# Patient Record
Sex: MALE | Employment: OTHER | URBAN - NONMETROPOLITAN AREA
[De-identification: names, ages, dates, MRNs, and addresses within clinical notes are randomized per-mention and may not be internally consistent; named-entity substitution may affect disease eponyms.]

---

## 2023-06-01 ENCOUNTER — OFFICE VISIT (OUTPATIENT)
Dept: URGENT CARE | Facility: CLINIC | Age: 78
End: 2023-06-01

## 2023-06-01 ENCOUNTER — HOSPITAL ENCOUNTER (OUTPATIENT)
Dept: RADIOLOGY | Facility: CLINIC | Age: 78
End: 2023-06-01
Payer: COMMERCIAL

## 2023-06-01 VITALS
RESPIRATION RATE: 18 BRPM | DIASTOLIC BLOOD PRESSURE: 60 MMHG | TEMPERATURE: 97.2 F | BODY MASS INDEX: 23.62 KG/M2 | HEART RATE: 77 BPM | WEIGHT: 165 LBS | OXYGEN SATURATION: 97 % | HEIGHT: 70 IN | SYSTOLIC BLOOD PRESSURE: 120 MMHG

## 2023-06-01 DIAGNOSIS — S69.92XA HAND INJURY, LEFT, INITIAL ENCOUNTER: Primary | ICD-10-CM

## 2023-06-01 DIAGNOSIS — S69.92XA INJURY OF LEFT HAND, INITIAL ENCOUNTER: ICD-10-CM

## 2023-06-01 PROCEDURE — 73130 X-RAY EXAM OF HAND: CPT

## 2023-06-01 PROCEDURE — G0463 HOSPITAL OUTPT CLINIC VISIT: HCPCS

## 2023-06-01 RX ORDER — BALSALAZIDE DISODIUM 750 MG/1
750 CAPSULE ORAL DAILY
COMMUNITY
Start: 2023-03-24

## 2023-06-01 RX ORDER — ASPIRIN 81 MG/1
81 TABLET ORAL
COMMUNITY
Start: 2023-05-08 | End: 2024-05-07

## 2023-06-01 RX ORDER — LISINOPRIL 10 MG/1
1 TABLET ORAL EVERY MORNING
COMMUNITY

## 2023-06-01 RX ORDER — ROSUVASTATIN CALCIUM 20 MG/1
20 TABLET, COATED ORAL DAILY
COMMUNITY
Start: 2023-01-04

## 2023-06-01 RX ORDER — CLOPIDOGREL BISULFATE 75 MG/1
75 TABLET ORAL DAILY
COMMUNITY
Start: 2023-01-13 | End: 2024-05-07

## 2023-06-01 NOTE — PROGRESS NOTES
[facility]        NAME: Arianna Cisneros is a 78 y o  male  : 1945    MRN: [id number]  DATE: 2023  TIME: 12:40 PM    Assessment and Plan   Hand injury, left, initial encounter [S69 [id number]]  1  Hand injury, left, initial encounter  XR hand 3+ vw left        Preliminary x-ray read negative for acute osseous abnormality, final read pending  Superficial laceration, no sutures required for wound closure  Tdap UTD  Wound cleansed and bandage applied by nursing staff  Encouraged continued supportive measures  Standard wound care  Monitor for signs of infection  Follow up with PCP in 3-5 days or proceed to emergency department for worsening symptoms  Patient verbalized understanding of instructions given  Patient Instructions     Patient Instructions     Preliminary XR read negative, final read pending  Cleanse daily with warm water and soap  Monitor for signs of infection, including redness, swelling, drainage, or fever/chills  Keep covered in public and open to air at home   Follow up with PCP in 3-5 days  Proceed to  ER if symptoms worsen  Laceration   AMBULATORY CARE:   A laceration  is an injury to the skin and the soft tissue underneath it  Lacerations can happen anywhere on the body  Common symptoms include the following:   • Injury or wound to skin and tissue of any shape size that looks like a cut, tear, or gash    • Edges of the wound may be close together or wide apart    • Pain, bleeding, bruising, or swelling    • Numbness around the wound    • Decreased movement in an area below the wound    Seek care immediately if:   • You have heavy bleeding or bleeding that does not stop after 10 minutes of holding firm, direct pressure over the wound  • Your wound opens up  Call your doctor if:   • You have a fever or chills  • Your laceration is red, warm, or swollen  • You have red streaks on your skin coming from your wound      • You have white or yellow drainage from the wound that smells bad  • You have pain that gets worse, even after treatment  • You have questions or concerns about your condition or care  Treatment for a laceration  includes care to stop any bleeding  Your healthcare provider will stop the bleeding by applying pressure to the wound  He or she may need to check your wound for foreign objects and clean it to decrease the chance of infection  Your laceration may be closed with stitches, staples, tissue glue, or medical strips  Ask your healthcare provider if you need a tetanus shot  Medicines: You may need any of the following:  • Prescription pain medicine  may be given  Ask your healthcare provider how to take this medicine safely  Some prescription pain medicines contain acetaminophen  Do not take other medicines that contain acetaminophen without talking to your healthcare provider  Too much acetaminophen may cause liver damage  Prescription pain medicine may cause constipation  Ask your healthcare provider how to prevent or treat constipation  • Antibiotics  help treat or prevent a bacterial infection  • Take your medicine as directed  Contact your healthcare provider if you think your medicine is not helping or if you have side effects  Tell your provider if you are allergic to any medicine  Keep a list of the medicines, vitamins, and herbs you take  Include the amounts, and when and why you take them  Bring the list or the pill bottles to follow-up visits  Carry your medicine list with you in case of an emergency  Care for your wound as directed:   • Do not get your wound wet  until your healthcare provider says it is okay  Do not soak your wound in water  Do not go swimming until your healthcare provider says it is okay  Carefully wash the wound with soap and water  Gently pat the area dry or allow it to air dry  • Change your bandages  when they get wet, dirty, or after washing  Apply new, clean bandages as directed   Do not apply elastic bandages or tape too tight  Do not put powders or lotions over your incision  • Apply antibiotic ointment as directed  Your healthcare provider may give you antibiotic ointment to put over your wound if you have stitches  If you have strips of tape over your incision, let them dry up and fall off on their own  If they do not fall off within 14 days, gently remove them  If you have glue over your wound, do not remove or pick at it  If your glue comes off, do not replace it with glue that you have at home  • Check your wound every day for signs of infection, such as swelling, redness, or pus  Self-care:   • Apply ice  on your wound for 15 to 20 minutes every hour or as directed  Use an ice pack, or put crushed ice in a plastic bag  Cover it with a towel  Ice helps prevent tissue damage and decreases swelling and pain  • Use a splint as directed  A splint will decrease movement and stress on your wound  It may help it heal faster  A splint may be used for lacerations over joints or areas of your body that bend  Ask your healthcare provider how to apply and remove a splint  • Decrease scarring of your wound  by applying ointments as directed  Do not apply ointments until your healthcare provider says it is okay  You may need to wait until your wound is healed  Ask which ointment to buy and how often to use it  After your wound is healed, use sunscreen over the area when you are out in the sun  You should do this for at least 6 months to 1 year after your injury  Follow up with your doctor as directed: You will need to return in 3 to 14 days to have stitches or staples removed  Write down your questions so you remember to ask them during your visits  © [facility] 2022 Information is for End User's use only and may not be sold, redistributed or otherwise used for commercial purposes  The above information is an  only   It is not intended as medical advice for individual conditions or treatments  Talk to your doctor, nurse or pharmacist before following any medical regimen to see if it is safe and effective for you  Chief Complaint     Chief Complaint   Patient presents with   • Hand Pain     C/o left hand laceration that pt obtained last night while working on his truck  Bleeding contained at time of arrival         History of Present Illness       53-year-old male with a past medical history significant for hypertension and DVT (on Plavix) presents with complaints of left hand laceration and swelling x2 days  Patient states he was using a  when he cut his left hand  Washed the wound with water and soap  Bruising and swelling to left ring and pinky finger but full range of motion  He is left hand dominant  Tdap UTD, 2022  Review of Systems   Review of Systems   Constitutional: Negative for chills and fever  Respiratory: Negative for shortness of breath  Cardiovascular: Negative for chest pain  Gastrointestinal: Negative for abdominal pain, diarrhea, nausea and vomiting  Musculoskeletal: Positive for arthralgias and joint swelling  Skin: Positive for color change and wound  Neurological: Negative for weakness and numbness           Current Medications       Current Outpatient Medications:   •  aspirin (ECOTRIN LOW STRENGTH) 81 mg EC tablet, Take 81 mg by mouth, Disp: , Rfl:   •  balsalazide (COLAZAL) 750 mg capsule, Take 750 mg by mouth daily, Disp: , Rfl:   •  clopidogrel (PLAVIX) 75 mg tablet, Take 75 mg by mouth daily, Disp: , Rfl:   •  lisinopril (ZESTRIL) 10 mg tablet, Take 1 tablet by mouth every morning, Disp: , Rfl:   •  rosuvastatin (CRESTOR) 20 MG tablet, Take 20 mg by mouth daily, Disp: , Rfl:     Current Allergies     Allergies as of 06/01/2023   • (No Known Allergies)            The following portions of the patient's history were reviewed and updated as appropriate: allergies, current medications, past family history, "past medical history, past social history, past surgical history and problem list      Past Medical History:   Diagnosis Date   • DVT, lower extremity ([address] )    • Hyperlipidemia    • Hypertension        Past Surgical History:   Procedure Laterality Date   • THROMBECTOMY         History reviewed  No pertinent family history  Medications have been verified  Objective   /60   Pulse 77   Temp (!) 97 2 °F (36 2 °C)   Resp 18   Ht 5' 10\" (1 778 m)   Wt 74 8 kg (165 lb)   SpO2 97%   BMI 23 68 kg/m²   No LMP for male patient  Physical Exam     Physical Exam  Vitals and nursing note reviewed  Constitutional:       General: He is not in acute distress  Appearance: He is not toxic-appearing  HENT:      Head: Normocephalic  Nose: Nose normal       Mouth/Throat:      Mouth: Mucous membranes are moist       Pharynx: Oropharynx is clear  Eyes:      Conjunctiva/sclera: Conjunctivae normal    Pulmonary:      Effort: Pulmonary effort is normal    Musculoskeletal:         General: Swelling and tenderness present  Left forearm: Normal       Left wrist: Normal  Normal pulse  Left hand: Swelling and bony tenderness present  Normal range of motion  Normal strength  Normal sensation  Normal capillary refill  Comments: < 1 cm superficial laceration over dorsum of left hand, above MCP joint with associated swelling  + ecchymosis over proximal aspect of left fourth and fifth digits of hand  Full ROM  NV intact  Skin:     General: Skin is warm and dry  Findings: Bruising and laceration present  Neurological:      Mental Status: He is alert and oriented to person, place, and time  Sensory: Sensation is intact  Motor: Motor function is intact  Gait: Gait is intact     Psychiatric:         Mood and Affect: Mood normal          Behavior: Behavior normal                    "

## 2023-06-01 NOTE — PATIENT INSTRUCTIONS
Preliminary XR read negative, final read pending  Cleanse daily with warm water and soap  Monitor for signs of infection, including redness, swelling, drainage, or fever/chills  Keep covered in public and open to air at home   Follow up with PCP in 3-5 days  Proceed to  ER if symptoms worsen  Laceration   AMBULATORY CARE:   A laceration  is an injury to the skin and the soft tissue underneath it  Lacerations can happen anywhere on the body  Common symptoms include the following:   Injury or wound to skin and tissue of any shape size that looks like a cut, tear, or gash    Edges of the wound may be close together or wide apart    Pain, bleeding, bruising, or swelling    Numbness around the wound    Decreased movement in an area below the wound    Seek care immediately if:   You have heavy bleeding or bleeding that does not stop after 10 minutes of holding firm, direct pressure over the wound  Your wound opens up  Call your doctor if:   You have a fever or chills  Your laceration is red, warm, or swollen  You have red streaks on your skin coming from your wound  You have white or yellow drainage from the wound that smells bad  You have pain that gets worse, even after treatment  You have questions or concerns about your condition or care  Treatment for a laceration  includes care to stop any bleeding  Your healthcare provider will stop the bleeding by applying pressure to the wound  He or she may need to check your wound for foreign objects and clean it to decrease the chance of infection  Your laceration may be closed with stitches, staples, tissue glue, or medical strips  Ask your healthcare provider if you need a tetanus shot  Medicines: You may need any of the following:  Prescription pain medicine  may be given  Ask your healthcare provider how to take this medicine safely  Some prescription pain medicines contain acetaminophen   Do not take other medicines that contain acetaminophen without talking to your healthcare provider  Too much acetaminophen may cause liver damage  Prescription pain medicine may cause constipation  Ask your healthcare provider how to prevent or treat constipation  Antibiotics  help treat or prevent a bacterial infection  Take your medicine as directed  Contact your healthcare provider if you think your medicine is not helping or if you have side effects  Tell your provider if you are allergic to any medicine  Keep a list of the medicines, vitamins, and herbs you take  Include the amounts, and when and why you take them  Bring the list or the pill bottles to follow-up visits  Carry your medicine list with you in case of an emergency  Care for your wound as directed:   Do not get your wound wet  until your healthcare provider says it is okay  Do not soak your wound in water  Do not go swimming until your healthcare provider says it is okay  Carefully wash the wound with soap and water  Gently pat the area dry or allow it to air dry  Change your bandages  when they get wet, dirty, or after washing  Apply new, clean bandages as directed  Do not apply elastic bandages or tape too tight  Do not put powders or lotions over your incision  Apply antibiotic ointment as directed  Your healthcare provider may give you antibiotic ointment to put over your wound if you have stitches  If you have strips of tape over your incision, let them dry up and fall off on their own  If they do not fall off within 14 days, gently remove them  If you have glue over your wound, do not remove or pick at it  If your glue comes off, do not replace it with glue that you have at home  Check your wound every day for signs of infection, such as swelling, redness, or pus  Self-care:   Apply ice  on your wound for 15 to 20 minutes every hour or as directed  Use an ice pack, or put crushed ice in a plastic bag  Cover it with a towel   Ice helps prevent tissue damage and decreases swelling and pain  Use a splint as directed  A splint will decrease movement and stress on your wound  It may help it heal faster  A splint may be used for lacerations over joints or areas of your body that bend  Ask your healthcare provider how to apply and remove a splint  Decrease scarring of your wound  by applying ointments as directed  Do not apply ointments until your healthcare provider says it is okay  You may need to wait until your wound is healed  Ask which ointment to buy and how often to use it  After your wound is healed, use sunscreen over the area when you are out in the sun  You should do this for at least 6 months to 1 year after your injury  Follow up with your doctor as directed: You will need to return in 3 to 14 days to have stitches or staples removed  Write down your questions so you remember to ask them during your visits  © Copyright [employer] 2022 Information is for End User's use only and may not be sold, redistributed or otherwise used for commercial purposes  The above information is an  only  It is not intended as medical advice for individual conditions or treatments  Talk to your doctor, nurse or pharmacist before following any medical regimen to see if it is safe and effective for you

## 2023-06-29 ENCOUNTER — APPOINTMENT (OUTPATIENT)
Dept: LAB | Facility: CLINIC | Age: 78
End: 2023-06-29
Payer: COMMERCIAL

## 2023-06-29 DIAGNOSIS — R94.6 NONSPECIFIC ABNORMAL RESULTS OF THYROID FUNCTION STUDY: ICD-10-CM

## 2023-06-29 DIAGNOSIS — E78.5 HYPERLIPIDEMIA, UNSPECIFIED HYPERLIPIDEMIA TYPE: ICD-10-CM

## 2023-06-29 DIAGNOSIS — R73.03 BORDERLINE DIABETES: ICD-10-CM

## 2023-06-29 DIAGNOSIS — D51.9 ANEMIA DUE TO VITAMIN B12 DEFICIENCY, UNSPECIFIED B12 DEFICIENCY TYPE: ICD-10-CM

## 2023-06-29 LAB
ALBUMIN SERPL BCP-MCNC: 3.9 G/DL (ref 3.5–5)
ALP SERPL-CCNC: 41 U/L (ref 46–116)
ALT SERPL W P-5'-P-CCNC: 23 U/L (ref 12–78)
ANION GAP SERPL CALCULATED.3IONS-SCNC: 4 MMOL/L
AST SERPL W P-5'-P-CCNC: 21 U/L (ref 5–45)
BASOPHILS # BLD AUTO: 0.04 THOUSANDS/ÂΜL (ref 0–0.1)
BASOPHILS NFR BLD AUTO: 1 % (ref 0–1)
BILIRUB SERPL-MCNC: 0.89 MG/DL (ref 0.2–1)
BUN SERPL-MCNC: 14 MG/DL (ref 5–25)
CALCIUM SERPL-MCNC: 9.3 MG/DL (ref 8.3–10.1)
CHLORIDE SERPL-SCNC: 111 MMOL/L (ref 96–108)
CHOLEST SERPL-MCNC: 183 MG/DL
CO2 SERPL-SCNC: 26 MMOL/L (ref 21–32)
CREAT SERPL-MCNC: 0.91 MG/DL (ref 0.6–1.3)
EOSINOPHIL # BLD AUTO: 0.3 THOUSAND/ÂΜL (ref 0–0.61)
EOSINOPHIL NFR BLD AUTO: 5 % (ref 0–6)
ERYTHROCYTE [DISTWIDTH] IN BLOOD BY AUTOMATED COUNT: 12.5 % (ref 11.6–15.1)
EST. AVERAGE GLUCOSE BLD GHB EST-MCNC: 120 MG/DL
GFR SERPL CREATININE-BSD FRML MDRD: 82 ML/MIN/1.73SQ M
GLUCOSE P FAST SERPL-MCNC: 96 MG/DL (ref 65–99)
HBA1C MFR BLD: 5.8 %
HCT VFR BLD AUTO: 45.5 % (ref 36.5–49.3)
HDLC SERPL-MCNC: 69 MG/DL
HGB BLD-MCNC: 14.8 G/DL (ref 12–17)
IMM GRANULOCYTES # BLD AUTO: 0.04 THOUSAND/UL (ref 0–0.2)
IMM GRANULOCYTES NFR BLD AUTO: 1 % (ref 0–2)
LDLC SERPL CALC-MCNC: 92 MG/DL (ref 0–100)
LYMPHOCYTES # BLD AUTO: 1.7 THOUSANDS/ÂΜL (ref 0.6–4.47)
LYMPHOCYTES NFR BLD AUTO: 29 % (ref 14–44)
MCH RBC QN AUTO: 31.4 PG (ref 26.8–34.3)
MCHC RBC AUTO-ENTMCNC: 32.5 G/DL (ref 31.4–37.4)
MCV RBC AUTO: 97 FL (ref 82–98)
MONOCYTES # BLD AUTO: 0.5 THOUSAND/ÂΜL (ref 0.17–1.22)
MONOCYTES NFR BLD AUTO: 9 % (ref 4–12)
NEUTROPHILS # BLD AUTO: 3.21 THOUSANDS/ÂΜL (ref 1.85–7.62)
NEUTS SEG NFR BLD AUTO: 55 % (ref 43–75)
NONHDLC SERPL-MCNC: 114 MG/DL
NRBC BLD AUTO-RTO: 0 /100 WBCS
PLATELET # BLD AUTO: 210 THOUSANDS/UL (ref 149–390)
PMV BLD AUTO: 9.3 FL (ref 8.9–12.7)
POTASSIUM SERPL-SCNC: 4.5 MMOL/L (ref 3.5–5.3)
PROT SERPL-MCNC: 7.8 G/DL (ref 6.4–8.4)
RBC # BLD AUTO: 4.71 MILLION/UL (ref 3.88–5.62)
SODIUM SERPL-SCNC: 141 MMOL/L (ref 135–147)
T4 FREE SERPL-MCNC: 0.67 NG/DL (ref 0.61–1.12)
TRIGL SERPL-MCNC: 109 MG/DL
TSH SERPL DL<=0.05 MIU/L-ACNC: 5.16 UIU/ML (ref 0.45–4.5)
VIT B12 SERPL-MCNC: 873 PG/ML (ref 180–914)
WBC # BLD AUTO: 5.79 THOUSAND/UL (ref 4.31–10.16)

## 2023-06-29 PROCEDURE — 82607 VITAMIN B-12: CPT

## 2023-06-29 PROCEDURE — 85025 COMPLETE CBC W/AUTO DIFF WBC: CPT

## 2023-06-29 PROCEDURE — 80061 LIPID PANEL: CPT

## 2023-06-29 PROCEDURE — 83036 HEMOGLOBIN GLYCOSYLATED A1C: CPT

## 2023-06-29 PROCEDURE — 84443 ASSAY THYROID STIM HORMONE: CPT

## 2023-06-29 PROCEDURE — 80053 COMPREHEN METABOLIC PANEL: CPT

## 2023-06-29 PROCEDURE — 36415 COLL VENOUS BLD VENIPUNCTURE: CPT

## 2023-06-29 PROCEDURE — 84439 ASSAY OF FREE THYROXINE: CPT

## 2023-10-19 ENCOUNTER — APPOINTMENT (OUTPATIENT)
Dept: LAB | Facility: CLINIC | Age: 78
End: 2023-10-19
Payer: COMMERCIAL

## 2023-10-19 DIAGNOSIS — I10 ESSENTIAL HYPERTENSION, MALIGNANT: ICD-10-CM

## 2023-10-19 DIAGNOSIS — R94.6 NONSPECIFIC ABNORMAL RESULTS OF THYROID FUNCTION STUDY: ICD-10-CM

## 2023-10-19 DIAGNOSIS — E78.5 HYPERLIPIDEMIA, UNSPECIFIED HYPERLIPIDEMIA TYPE: ICD-10-CM

## 2023-10-19 DIAGNOSIS — R73.03 DIABETES MELLITUS, LATENT: ICD-10-CM

## 2023-10-19 DIAGNOSIS — D51.9 ANEMIA DUE TO VITAMIN B12 DEFICIENCY, UNSPECIFIED B12 DEFICIENCY TYPE: ICD-10-CM

## 2023-10-19 LAB
ALBUMIN SERPL BCP-MCNC: 4.1 G/DL (ref 3.5–5)
ALP SERPL-CCNC: 36 U/L (ref 34–104)
ALT SERPL W P-5'-P-CCNC: 14 U/L (ref 7–52)
ANION GAP SERPL CALCULATED.3IONS-SCNC: 7 MMOL/L
AST SERPL W P-5'-P-CCNC: 20 U/L (ref 13–39)
BASOPHILS # BLD AUTO: 0.04 THOUSANDS/ÂΜL (ref 0–0.1)
BASOPHILS NFR BLD AUTO: 1 % (ref 0–1)
BILIRUB SERPL-MCNC: 0.79 MG/DL (ref 0.2–1)
BUN SERPL-MCNC: 14 MG/DL (ref 5–25)
CALCIUM SERPL-MCNC: 9.7 MG/DL (ref 8.4–10.2)
CHLORIDE SERPL-SCNC: 104 MMOL/L (ref 96–108)
CHOLEST SERPL-MCNC: 179 MG/DL
CO2 SERPL-SCNC: 29 MMOL/L (ref 21–32)
CREAT SERPL-MCNC: 0.78 MG/DL (ref 0.6–1.3)
EOSINOPHIL # BLD AUTO: 0.48 THOUSAND/ÂΜL (ref 0–0.61)
EOSINOPHIL NFR BLD AUTO: 8 % (ref 0–6)
ERYTHROCYTE [DISTWIDTH] IN BLOOD BY AUTOMATED COUNT: 12.2 % (ref 11.6–15.1)
EST. AVERAGE GLUCOSE BLD GHB EST-MCNC: 128 MG/DL
GFR SERPL CREATININE-BSD FRML MDRD: 87 ML/MIN/1.73SQ M
GLUCOSE P FAST SERPL-MCNC: 97 MG/DL (ref 65–99)
HBA1C MFR BLD: 6.1 %
HCT VFR BLD AUTO: 46.9 % (ref 36.5–49.3)
HDLC SERPL-MCNC: 73 MG/DL
HGB BLD-MCNC: 15.1 G/DL (ref 12–17)
IMM GRANULOCYTES # BLD AUTO: 0.03 THOUSAND/UL (ref 0–0.2)
IMM GRANULOCYTES NFR BLD AUTO: 1 % (ref 0–2)
LDLC SERPL CALC-MCNC: 87 MG/DL (ref 0–100)
LYMPHOCYTES # BLD AUTO: 1.49 THOUSANDS/ÂΜL (ref 0.6–4.47)
LYMPHOCYTES NFR BLD AUTO: 26 % (ref 14–44)
MCH RBC QN AUTO: 32.1 PG (ref 26.8–34.3)
MCHC RBC AUTO-ENTMCNC: 32.2 G/DL (ref 31.4–37.4)
MCV RBC AUTO: 100 FL (ref 82–98)
MONOCYTES # BLD AUTO: 0.57 THOUSAND/ÂΜL (ref 0.17–1.22)
MONOCYTES NFR BLD AUTO: 10 % (ref 4–12)
NEUTROPHILS # BLD AUTO: 3.09 THOUSANDS/ÂΜL (ref 1.85–7.62)
NEUTS SEG NFR BLD AUTO: 54 % (ref 43–75)
NONHDLC SERPL-MCNC: 106 MG/DL
NRBC BLD AUTO-RTO: 0 /100 WBCS
PLATELET # BLD AUTO: 204 THOUSANDS/UL (ref 149–390)
PMV BLD AUTO: 9.4 FL (ref 8.9–12.7)
POTASSIUM SERPL-SCNC: 5 MMOL/L (ref 3.5–5.3)
PROT SERPL-MCNC: 7.3 G/DL (ref 6.4–8.4)
RBC # BLD AUTO: 4.71 MILLION/UL (ref 3.88–5.62)
SODIUM SERPL-SCNC: 140 MMOL/L (ref 135–147)
T4 FREE SERPL-MCNC: 0.69 NG/DL (ref 0.61–1.12)
TRIGL SERPL-MCNC: 97 MG/DL
TSH SERPL DL<=0.05 MIU/L-ACNC: 4.89 UIU/ML (ref 0.45–4.5)
VIT B12 SERPL-MCNC: 650 PG/ML (ref 180–914)
WBC # BLD AUTO: 5.7 THOUSAND/UL (ref 4.31–10.16)

## 2023-10-19 PROCEDURE — 85025 COMPLETE CBC W/AUTO DIFF WBC: CPT

## 2023-10-19 PROCEDURE — 82607 VITAMIN B-12: CPT

## 2023-10-19 PROCEDURE — 83036 HEMOGLOBIN GLYCOSYLATED A1C: CPT

## 2023-10-19 PROCEDURE — 84443 ASSAY THYROID STIM HORMONE: CPT

## 2023-10-19 PROCEDURE — 80061 LIPID PANEL: CPT

## 2023-10-19 PROCEDURE — 80053 COMPREHEN METABOLIC PANEL: CPT

## 2023-10-19 PROCEDURE — 84439 ASSAY OF FREE THYROXINE: CPT

## 2023-10-19 PROCEDURE — 36415 COLL VENOUS BLD VENIPUNCTURE: CPT

## 2023-11-02 ENCOUNTER — OFFICE VISIT (OUTPATIENT)
Dept: URGENT CARE | Facility: CLINIC | Age: 78
End: 2023-11-02
Payer: COMMERCIAL

## 2023-11-02 VITALS
DIASTOLIC BLOOD PRESSURE: 80 MMHG | BODY MASS INDEX: 23.62 KG/M2 | HEART RATE: 78 BPM | TEMPERATURE: 98 F | SYSTOLIC BLOOD PRESSURE: 112 MMHG | WEIGHT: 165 LBS | OXYGEN SATURATION: 95 % | RESPIRATION RATE: 16 BRPM | HEIGHT: 70 IN
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
## 2023-11-02 DIAGNOSIS — S30.860A TICK BITE OF BACK, INITIAL ENCOUNTER: Primary | ICD-10-CM

## 2023-11-02 PROCEDURE — 99213 OFFICE O/P EST LOW 20 MIN: CPT

## 2023-11-02 RX ORDER — FLUOXETINE HYDROCHLORIDE 20 MG/1
20 CAPSULE ORAL DAILY
COMMUNITY
Start: 2023-09-26

## 2023-11-02 RX ORDER — DOXYCYCLINE 100 MG/1
200 TABLET ORAL ONCE
Qty: 2 TABLET | Refills: 0 | Status: SHIPPED | OUTPATIENT
Start: 2023-11-02 | End: 2023-11-02

## 2023-11-02 NOTE — PROGRESS NOTES
[facility]        NAME: Zoë Aparicio is a 70 y.o. male  : 1945    MRN: [id number]  DATE: 2023  TIME: 9:29 AM    Assessment and Plan   Tick bite of back, initial encounter [S30.860A, W57. XXXA]  1. Tick bite of back, initial encounter  doxycycline (ADOXA) 100 MG tablet    Foreign body removal        Universal Protocol:  Procedure performed by: Caryle Merle, PA-S)  Consent: Verbal consent obtained. Risks and benefits: risks, benefits and alternatives were discussed  Consent given by: patient  Time out: Immediately prior to procedure a "time out" was called to verify the correct patient, procedure, equipment, support staff and site/side marked as required. Patient understanding: patient states understanding of the procedure being performed  Patient identity confirmed: verbally with patient  Foreign body removal    Date/Time: 2023 9:25 AM    Performed by: CONY Clarke  Authorized by: Marty Fernandez DO  Body area: skin  General location: trunk  Location details: back  1 objects recovered. Objects recovered: tick  Post-procedure assessment: foreign body removed  Patient tolerance: patient tolerated the procedure well with no immediate complications      Tick removed by Caryle Merle PA-S under my supervision with tweezers, fully removed. Tolerated well. Will treat for Lyme prophylaxis with Doxycycline and advised to monitor for symptoms of Lyme Disease. Encouraged continued supportive measures. Follow up with PCP in 3-5 days or proceed to emergency department for worsening symptoms. Patient verbalized understanding of instructions given. Patient Instructions     Patient Instructions     Tick removed  Take antibiotic as prescribed  Monitor for signs of infection  Monitor for fever, chills, headache, body aches, joint pain, or rash. May need Lyme Disease testing. Follow up with PCP in 3-5 days. Proceed to  ER if symptoms worsen.     Tick Bite   AMBULATORY CARE: What you need to know about a tick bite:  Ticks need to be removed quickly. Most tick bites are not dangerous, but ticks can pass disease or infection when they bite. Diseases include Lyme disease, babesiosis, tularemia, and [place] Spotted Fever. Signs and symptoms of a tick bite  may develop right away, or weeks or even months after a bite. You may have redness, pain, itching, and swelling near the bite area. Blisters may also develop. You may develop tick paralysis, a temporary condition that causes problems with leg movement. A disease from a tick bite may cause a fever, rash, body aches, or breathing problems. Seek care immediately if:   You have trouble walking or moving your legs. You have joint pain, muscle pain, or muscle weakness within 1 month of a tick bite. You have a fever, chills, headache, or rash. Call your doctor if:   You cannot remove the tick. The tick's head is stuck in your skin. You have questions or concerns about your condition or care. Treatment:  Treatment for a disease passed during the bite depends on the disease. You may only need medicines to lower a fever or fight a bacterial infection. More serious diseases can cause conditions such as breathing problems that need to be treated in a hospital. The following can help treat symptoms at the bite area:  Apply ice  on your bite for 15 to 20 minutes every hour or as directed. Use an ice pack, or put crushed ice in a plastic bag. Cover it with a towel before you apply it to your skin. Ice helps prevent tissue damage and decreases swelling and pain. Medicines  help decrease pain, redness, itching, and swelling. You may also need medicine to prevent or fight a bacterial infection. These medicines may be given as a cream, lotion, or pill. How to remove a tick:  Remove the tick as soon as possible to help prevent disease or infection.  You are less likely to get sick from a tick bite if you remove the tick within 24 hours. Do not use petroleum jelly, nail polish, rubbing alcohol, or heat. These do not work and may be dangerous. Do the following to remove a tick:  First, try a soapy cotton ball. Soak a cotton ball in liquid soap. Cover the tick with the cotton ball for 30 seconds. The tick may come off with the cotton ball when you pull it away. Use tweezers if the soapy cotton ball does not work. Grasp the tick as close to your skin as possible. Pull the tick straight up and out. Do not touch the tick with your bare hands. Do not twist or jerk the tick suddenly, because this may break off the tick's head or mouth parts. Do not leave any part of the tick in your skin. Do not crush or squeeze the tick since its body may be infected with germs. Flush the tick down the toilet. After the tick is removed, clean the area of the bite with rubbing alcohol. Then wash your hands with soap and water. Prevent a tick bite:  Ticks live in areas covered by brush and grass. They may even be found in your lawn if you live in certain areas. Outdoor pets can carry ticks inside the house. Ticks can grab onto you or your clothes when you walk by grass or brush. If you go into areas that contain many trees, tall grasses, and underbrush, do the following:  Wear light colored pants and a long-sleeved shirt. Tuck your pants into your socks or boots. Tuck in your shirt. Wear sleeves that fit close to the skin at your wrists and neck. This will help prevent ticks from crawling through gaps in your clothing and onto your skin. Wear a hat in areas with trees. Apply insect repellant on your skin. The insect repellant should contain DEET. Do not put insect repellant on skin that is cut, scratched, or irritated. Always use soap and water to wash the insect repellant off as soon as possible once you are indoors. Do not apply insect repellant on your child's face or hands. Spray insect repellant onto your clothes.   Use permethrin spray. This spray kills ticks that crawl on your clothing. Be sure to spray the tops of your boots, bottom of pant legs, and sleeve cuffs. As soon as possible, wash and dry clothing in hot water and high heat. Check your clothing, hair, and skin for ticks. Shower within 2 hours of coming indoors. Carefully check the hairline, armpits, neck, and waist. Check your pets and children for ticks. Remove ticks from pets the same way as you remove them from people. Decrease the risk for ticks in your yard. Ticks like to live in shady, moist areas. Nithya Lorin your lawn regularly to keep the grass short. Trim the grass around birdbaths and fences. Cut branches that are overgrown and take them out of the yard. Clear out leaf piles. Alexia Kraft firewood in a dry, [place] area. Follow up with your doctor as directed:  Write down your questions so you remember to ask them during your visits. © Copyright [facility] 2023 Information is for End User's use only and may not be sold, redistributed or otherwise used for commercial purposes. The above information is an  only. It is not intended as medical advice for individual conditions or treatments. Talk to your doctor, nurse or pharmacist before following any medical regimen to see if it is safe and effective for you. Chief Complaint     Chief Complaint   Patient presents with    Tick Removal     Has a tic on right thoracic area. Noticed it 1 day ago. 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  Patient reports noticing a tick on the right side of his upper back today and believes to be embedded for 2 days. Able to remove at home. Patient reports spending time outdoors hunting. Previous history of Lyme disease. Currently, no fever, chills, headache, myalgias, arthralgias, or rash.         Review of Systems   Review of Systems   Constitutional:  Negative for chills and fever. HENT:  Negative for congestion, ear discharge, ear pain, rhinorrhea, sore throat, trouble swallowing and voice change. Eyes:  Negative for discharge. Respiratory:  Negative for cough and shortness of breath. Cardiovascular:  Negative for chest pain. Gastrointestinal:  Negative for abdominal pain, diarrhea, nausea and vomiting. Musculoskeletal:  Negative for arthralgias and myalgias. Skin:  Positive for color change. Neurological:  Negative for headaches. Current Medications       Current Outpatient Medications:     aspirin (ECOTRIN LOW STRENGTH) 81 mg EC tablet, Take 81 mg by mouth, Disp: , Rfl:     balsalazide (COLAZAL) 750 mg capsule, Take 750 mg by mouth daily, Disp: , Rfl:     clopidogrel (PLAVIX) 75 mg tablet, Take 75 mg by mouth daily, Disp: , Rfl:     doxycycline (ADOXA) 100 MG tablet, Take 2 tablets (200 mg total) by mouth 1 (one) time for 1 dose, Disp: 2 tablet, Rfl: 0    FLUoxetine (PROzac) 20 mg capsule, Take 20 mg by mouth daily, Disp: , Rfl:     lisinopril (ZESTRIL) 10 mg tablet, Take 1 tablet by mouth every morning, Disp: , Rfl:     rosuvastatin (CRESTOR) 20 MG tablet, Take 20 mg by mouth daily, Disp: , Rfl:     Current Allergies     Allergies as of 11/02/2023    (No Known Allergies)            The following portions of the patient's history were reviewed and updated as appropriate: allergies, current medications, past family history, past medical history, past social history, past surgical history and problem list.     Past Medical History:   Diagnosis Date    DVT, lower extremity ([address])     Hyperlipidemia     Hypertension     Lyme disease        Past Surgical History:   Procedure Laterality Date    THROMBECTOMY         Family History   Problem Relation Age of Onset    Anuerysm Mother     Cancer Father          Medications have been verified.         Objective   /80   Pulse 78   Temp 98 °F (36.7 °C)   Resp 16 Ht 5' 10" (1.778 m)   Wt 74.8 kg (165 lb)   SpO2 95%   BMI 23.68 kg/m²   No LMP for male patient. Physical Exam     Physical Exam  Vitals and nursing note reviewed. Constitutional:       General: He is not in acute distress. Appearance: He is not toxic-appearing. HENT:      Head: Normocephalic. Nose: Nose normal.      Mouth/Throat:      Mouth: Mucous membranes are moist.      Pharynx: Oropharynx is clear. Eyes:      Conjunctiva/sclera: Conjunctivae normal.   Pulmonary:      Effort: Pulmonary effort is normal.   Skin:     General: Skin is warm and dry. Neurological:      Mental Status: He is alert and oriented to person, place, and time. Gait: Gait is intact.    Psychiatric:         Mood and Affect: Mood normal.         Behavior: Behavior normal.

## 2023-11-02 NOTE — PATIENT INSTRUCTIONS
Tick removed  Take antibiotic as prescribed  Monitor for signs of infection  Monitor for fever, chills, headache, body aches, joint pain, or rash. May need Lyme Disease testing. Follow up with PCP in 3-5 days. Proceed to  ER if symptoms worsen. Tick Bite   AMBULATORY CARE:   What you need to know about a tick bite:  Ticks need to be removed quickly. Most tick bites are not dangerous, but ticks can pass disease or infection when they bite. Diseases include Lyme disease, babesiosis, tularemia, and [place] Spotted Fever. Signs and symptoms of a tick bite  may develop right away, or weeks or even months after a bite. You may have redness, pain, itching, and swelling near the bite area. Blisters may also develop. You may develop tick paralysis, a temporary condition that causes problems with leg movement. A disease from a tick bite may cause a fever, rash, body aches, or breathing problems. Seek care immediately if:   You have trouble walking or moving your legs. You have joint pain, muscle pain, or muscle weakness within 1 month of a tick bite. You have a fever, chills, headache, or rash. Call your doctor if:   You cannot remove the tick. The tick's head is stuck in your skin. You have questions or concerns about your condition or care. Treatment:  Treatment for a disease passed during the bite depends on the disease. You may only need medicines to lower a fever or fight a bacterial infection. More serious diseases can cause conditions such as breathing problems that need to be treated in a hospital. The following can help treat symptoms at the bite area:  Apply ice  on your bite for 15 to 20 minutes every hour or as directed. Use an ice pack, or put crushed ice in a plastic bag. Cover it with a towel before you apply it to your skin. Ice helps prevent tissue damage and decreases swelling and pain. Medicines  help decrease pain, redness, itching, and swelling.  You may also need medicine to prevent or fight a bacterial infection. These medicines may be given as a cream, lotion, or pill. How to remove a tick:  Remove the tick as soon as possible to help prevent disease or infection. You are less likely to get sick from a tick bite if you remove the tick within 24 hours. Do not use petroleum jelly, nail polish, rubbing alcohol, or heat. These do not work and may be dangerous. Do the following to remove a tick:  First, try a soapy cotton ball. Soak a cotton ball in liquid soap. Cover the tick with the cotton ball for 30 seconds. The tick may come off with the cotton ball when you pull it away. Use tweezers if the soapy cotton ball does not work. Grasp the tick as close to your skin as possible. Pull the tick straight up and out. Do not touch the tick with your bare hands. Do not twist or jerk the tick suddenly, because this may break off the tick's head or mouth parts. Do not leave any part of the tick in your skin. Do not crush or squeeze the tick since its body may be infected with germs. Flush the tick down the toilet. After the tick is removed, clean the area of the bite with rubbing alcohol. Then wash your hands with soap and water. Prevent a tick bite:  Ticks live in areas covered by brush and grass. They may even be found in your lawn if you live in certain areas. Outdoor pets can carry ticks inside the house. Ticks can grab onto you or your clothes when you walk by grass or brush. If you go into areas that contain many trees, tall grasses, and underbrush, do the following:  Wear light colored pants and a long-sleeved shirt. Tuck your pants into your socks or boots. Tuck in your shirt. Wear sleeves that fit close to the skin at your wrists and neck. This will help prevent ticks from crawling through gaps in your clothing and onto your skin. Wear a hat in areas with trees. Apply insect repellant on your skin. The insect repellant should contain DEET.  Do not put insect repellant on skin that is cut, scratched, or irritated. Always use soap and water to wash the insect repellant off as soon as possible once you are indoors. Do not apply insect repellant on your child's face or hands. Spray insect repellant onto your clothes. Use permethrin spray. This spray kills ticks that crawl on your clothing. Be sure to spray the tops of your boots, bottom of pant legs, and sleeve cuffs. As soon as possible, wash and dry clothing in hot water and high heat. Check your clothing, hair, and skin for ticks. Shower within 2 hours of coming indoors. Carefully check the hairline, armpits, neck, and waist. Check your pets and children for ticks. Remove ticks from pets the same way as you remove them from people. Decrease the risk for ticks in your yard. Ticks like to live in shady, moist areas. Jeffery Zonia your lawn regularly to keep the grass short. Trim the grass around birdbaths and fences. Cut branches that are overgrown and take them out of the yard. Clear out leaf piles. Corinne Bicker firewood in a dry, [place] area. Follow up with your doctor as directed:  Write down your questions so you remember to ask them during your visits. © Copyright [facility] 2023 Information is for End User's use only and may not be sold, redistributed or otherwise used for commercial purposes. The above information is an  only. It is not intended as medical advice for individual conditions or treatments. Talk to your doctor, nurse or pharmacist before following any medical regimen to see if it is safe and effective for you.